# Patient Record
Sex: MALE | Race: WHITE
[De-identification: names, ages, dates, MRNs, and addresses within clinical notes are randomized per-mention and may not be internally consistent; named-entity substitution may affect disease eponyms.]

---

## 2019-07-15 LAB
ADD MANUAL DIFF: NO
ANION GAP SERPL CALC-SCNC: 10 MMOL/L (ref 5–19)
BASOPHILS # BLD AUTO: 0 10^3/UL (ref 0–0.2)
BASOPHILS NFR BLD AUTO: 0.9 % (ref 0–2)
BUN SERPL-MCNC: 15 MG/DL (ref 7–20)
CALCIUM: 9.5 MG/DL (ref 8.4–10.2)
CHLORIDE SERPL-SCNC: 105 MMOL/L (ref 98–107)
CO2 SERPL-SCNC: 26 MMOL/L (ref 22–30)
EOSINOPHIL # BLD AUTO: 0.1 10^3/UL (ref 0–0.6)
EOSINOPHIL NFR BLD AUTO: 2.6 % (ref 0–6)
ERYTHROCYTE [DISTWIDTH] IN BLOOD BY AUTOMATED COUNT: 13.7 % (ref 11.5–14)
GLUCOSE SERPL-MCNC: 134 MG/DL (ref 75–110)
HCT VFR BLD CALC: 44.5 % (ref 37.9–51)
HGB BLD-MCNC: 15.1 G/DL (ref 13.5–17)
LYMPHOCYTES # BLD AUTO: 1.4 10^3/UL (ref 0.5–4.7)
LYMPHOCYTES NFR BLD AUTO: 29.2 % (ref 13–45)
MCH RBC QN AUTO: 29.4 PG (ref 27–33.4)
MCHC RBC AUTO-ENTMCNC: 33.9 G/DL (ref 32–36)
MCV RBC AUTO: 87 FL (ref 80–97)
MONOCYTES # BLD AUTO: 0.4 10^3/UL (ref 0.1–1.4)
MONOCYTES NFR BLD AUTO: 9.4 % (ref 3–13)
NEUTROPHILS # BLD AUTO: 2.7 10^3/UL (ref 1.7–8.2)
NEUTS SEG NFR BLD AUTO: 57.9 % (ref 42–78)
PLATELET # BLD: 238 10^3/UL (ref 150–450)
POTASSIUM SERPL-SCNC: 4.5 MMOL/L (ref 3.6–5)
RBC # BLD AUTO: 5.14 10^6/UL (ref 4.35–5.55)
TOTAL CELLS COUNTED % (AUTO): 100 %
WBC # BLD AUTO: 4.6 10^3/UL (ref 4–10.5)

## 2019-07-22 ENCOUNTER — HOSPITAL ENCOUNTER (INPATIENT)
Dept: HOSPITAL 62 - INOR | Age: 59
LOS: 4 days | Discharge: HOME | DRG: 331 | End: 2019-07-26
Attending: SURGERY | Admitting: SURGERY
Payer: COMMERCIAL

## 2019-07-22 DIAGNOSIS — E78.00: ICD-10-CM

## 2019-07-22 DIAGNOSIS — Z86.010: ICD-10-CM

## 2019-07-22 DIAGNOSIS — G47.30: ICD-10-CM

## 2019-07-22 DIAGNOSIS — I10: ICD-10-CM

## 2019-07-22 DIAGNOSIS — Z88.6: ICD-10-CM

## 2019-07-22 DIAGNOSIS — Z90.5: ICD-10-CM

## 2019-07-22 DIAGNOSIS — D12.0: Primary | ICD-10-CM

## 2019-07-22 PROCEDURE — 85025 COMPLETE CBC W/AUTO DIFF WBC: CPT

## 2019-07-22 PROCEDURE — 93005 ELECTROCARDIOGRAM TRACING: CPT

## 2019-07-22 PROCEDURE — 5A09357 ASSISTANCE WITH RESPIRATORY VENTILATION, LESS THAN 24 CONSECUTIVE HOURS, CONTINUOUS POSITIVE AIRWAY PRESSURE: ICD-10-PCS | Performed by: SURGERY

## 2019-07-22 PROCEDURE — 80048 BASIC METABOLIC PNL TOTAL CA: CPT

## 2019-07-22 PROCEDURE — 0DTF4ZZ RESECTION OF RIGHT LARGE INTESTINE, PERCUTANEOUS ENDOSCOPIC APPROACH: ICD-10-PCS | Performed by: SURGERY

## 2019-07-22 PROCEDURE — 70490 CT SOFT TISSUE NECK W/O DYE: CPT

## 2019-07-22 PROCEDURE — 93010 ELECTROCARDIOGRAM REPORT: CPT

## 2019-07-22 PROCEDURE — S0028 INJECTION, FAMOTIDINE, 20 MG: HCPCS

## 2019-07-22 PROCEDURE — 36415 COLL VENOUS BLD VENIPUNCTURE: CPT

## 2019-07-22 PROCEDURE — 88309 TISSUE EXAM BY PATHOLOGIST: CPT

## 2019-07-22 PROCEDURE — 94799 UNLISTED PULMONARY SVC/PX: CPT

## 2019-07-22 PROCEDURE — 0D1B4ZL BYPASS ILEUM TO TRANSVERSE COLON, PERCUTANEOUS ENDOSCOPIC APPROACH: ICD-10-PCS | Performed by: SURGERY

## 2019-07-22 RX ADMIN — MORPHINE SULFATE PRN MG: 10 INJECTION INTRAMUSCULAR; INTRAVENOUS; SUBCUTANEOUS at 20:13

## 2019-07-22 RX ADMIN — FAMOTIDINE SCH MG: 10 INJECTION INTRAVENOUS at 21:28

## 2019-07-22 RX ADMIN — DEXTROSE, SODIUM CHLORIDE, AND POTASSIUM CHLORIDE PRN MLS/HR: 5; .45; .15 INJECTION INTRAVENOUS at 21:29

## 2019-07-22 RX ADMIN — HEPARIN SODIUM SCH: 5000 INJECTION, SOLUTION INTRAVENOUS; SUBCUTANEOUS at 21:24

## 2019-07-22 NOTE — OPERATIVE REPORT
Nonrecallable Operative Report


DATE OF SURGERY: 07/22/19


PREOPERATIVE DIAGNOSIS: right colon polyp


POSTOPERATIVE DIAGNOSIS: rt colon polyp


OPERATION: rt hemicolectomy, laparoscopic


SURGEON: HAIDER ISIDRO


1ST ASSISTANT: ELLIOTT THOMAS


ANESTHESIA: GA


TISSUE REMOVED OR ALTERED: rt colon


COMPLICATIONS: 





none


ESTIMATED BLOOD LOSS: 50cc


INTRAOPERATIVE FINDINGS: see dictation


PROCEDURE: 





see dictation

## 2019-07-22 NOTE — OPERATIVE REPORT E
Operative Report



NAME: JERRY MARR

MRN:  [id number]          : 1960 AGE:  58Y

DATE OF SURGERY: 2019                        ROOM: OR



PREOPERATIVE DIAGNOSIS:

RIGHT COLONIC SESSILE POLYP.



POSTOPERATIVE DIAGNOSIS:

RIGHT COLONIC SESSILE POLYP.



OPERATION:

Laparoscopic-assisted right hemicolectomy.



SURGEON:

HAIDER ISIDRO M.D.



ASSISTANT:

Meghan *------*, who was present for the entire case, for wound retraction

and wound closure.



PROCEDURE:

Patient was brought to the operating room, awake, alert, in stable

condition.  Placed on the operating table in supine position.  Induced

under general anesthesia, intubated.  The abdomen was prepped and draped

in the usual sterile manner for the procedure.



A Veress needle was placed into the umbilicus and the abdomen was

insufflated with 6 liters of CO2 gas.  A supraumbilical 10-mm incision was

made with a 15 blade and a 10-mm port placed in the abdominal cavity. 

Intraabdominal visualization revealed no evidence of a Veress needle or

trocar injury.  A left lower quadrant 10-mm port placed under direct

vision and a left-sided 5-mm port, all under direct vision.



The cecum was identified.  It was placed on traction.  The appendix was

identified.  The peritoneal attachments between the terminal ileum,

appendix and cecum were taken down with a LigaSure device.  Then we came

up along the ascending colon, along the peritoneal reflection with the

LigaSure device, all the way to the hepatic flexure.



Once this was accomplished, we mobilized the proximal transverse colon

away from the omentum with the LigaSure device to free up the hepatic

flexure and proximal transverse colon.  Once this was accomplished and we

had good mobilization of the right colon and hepatic flexure, we

identified the duodenum and made sure that there was no injury to that. 

We then removed the LigaSure device and made a transverse incision in the

right mid abdominal wall with a 15 blade, and carried out dissection out

through subcutaneous tissue with Bovie cautery.  The transversalis muscle

and rectus fascia were divided with Bovie cautery and we used the Richard

wound protector placed into the abdominal cavity.



Once the Richard wound protector was placed, we were able to deliver the

cecum and ascending colon to the wound.  We came across the distal ileum

with 1 firing of the ELDON stapler with a blue load.  Then we came across

the mesentery with multiple firings of the LigaSure device all the way

past the hepatic flexure to the proximal transverse colon.  We then were

able to remove the specimen.



We approximated the proximal transverse colon to the terminal ileum and

created an ileocolostomy anastomosis using sewn technique.  The outer

layer was interrupted 2-0 silk and the running inner layer was 3-0 Vicryl

and the anterior layer was interrupted 2-0 silk.  We then reapproximated

the mesenteric defect with figure-of-eight placed 2-0 silk.  We allowed

the specimen to drop back into the abdominal cavity and removed the Richard

wound protector.  We closed the posterior sheath with a running 0 Vicryl

and the anterior sheath was closed with interrupted 0 Vicryl.



Once this was accomplished, we irrigated the wound with sterile saline and

suctioned dry.  I then reinsufflated the abdominal cavity and identified

the ileum and identified our anastomosis as well as our mesentery repair. 

It was intact.  The anastomosis was not twisted and looked secure.  We

irrigated the right upper quadrant with normal saline, suctioned dry. 

Reduced the pneumoperitoneum.  We then proceeded to close the 10-mm

fascial defects at the umbilical port site and the left lower quadrant

with 0 Vicryl, and then closed all skin incisions with intracuticular 4-0

Biosyn.  Steri-Strips completed the procedure.



Estimated blood loss was less than 50 mL.  Sponge and needle counts

correct x2.  The patient was awakened in the operating room, extubated and

transferred to recovery in stable condition.  No complications.



DICTATING PHYSICIAN:  HAIDER ISIDRO M.D.





[id number]                  DT: 2019    1859

PHY#: [id number]            DD: 2019    1755

ID:   [id number]           JOB#: [id number]       ACCT: [id number]



cc:HAIDER ISIDRO M.D.

>

## 2019-07-23 LAB
ABSOLUTE LYMPHOCYTES# (MANUAL): 0.9 10^3/UL (ref 0.5–4.7)
ABSOLUTE MONOCYTES # (MANUAL): 0.5 10^3/UL (ref 0.1–1.4)
ADD MANUAL DIFF: YES
ANION GAP SERPL CALC-SCNC: 10 MMOL/L (ref 5–19)
BASOPHILS NFR BLD MANUAL: 0 % (ref 0–2)
BUN SERPL-MCNC: 14 MG/DL (ref 7–20)
CALCIUM: 9 MG/DL (ref 8.4–10.2)
CHLORIDE SERPL-SCNC: 103 MMOL/L (ref 98–107)
CO2 SERPL-SCNC: 24 MMOL/L (ref 22–30)
EOSINOPHIL NFR BLD MANUAL: 0 % (ref 0–6)
ERYTHROCYTE [DISTWIDTH] IN BLOOD BY AUTOMATED COUNT: 13.8 % (ref 11.5–14)
GLUCOSE SERPL-MCNC: 229 MG/DL (ref 75–110)
HCT VFR BLD CALC: 42.9 % (ref 37.9–51)
HGB BLD-MCNC: 14.1 G/DL (ref 13.5–17)
MCH RBC QN AUTO: 28.9 PG (ref 27–33.4)
MCHC RBC AUTO-ENTMCNC: 32.9 G/DL (ref 32–36)
MCV RBC AUTO: 88 FL (ref 80–97)
MONOCYTES % (MANUAL): 4 % (ref 3–13)
NEUTS BAND NFR BLD MANUAL: 2 % (ref 3–5)
PLATELET # BLD: 228 10^3/UL (ref 150–450)
PLATELET COMMENT: ADEQUATE
POTASSIUM SERPL-SCNC: 4.8 MMOL/L (ref 3.6–5)
RBC # BLD AUTO: 4.89 10^6/UL (ref 4.35–5.55)
RBC MORPH BLD: (no result)
SEGMENTED NEUTROPHILS % (MAN): 86 % (ref 42–78)
TOTAL CELLS COUNTED BLD: 100
VARIANT LYMPHS NFR BLD MANUAL: 8 % (ref 13–45)
WBC # BLD AUTO: 11.6 10^3/UL (ref 4–10.5)

## 2019-07-23 RX ADMIN — MORPHINE SULFATE PRN MG: 10 INJECTION INTRAMUSCULAR; INTRAVENOUS; SUBCUTANEOUS at 04:39

## 2019-07-23 RX ADMIN — HEPARIN SODIUM SCH UNIT: 5000 INJECTION, SOLUTION INTRAVENOUS; SUBCUTANEOUS at 22:42

## 2019-07-23 RX ADMIN — HEPARIN SODIUM SCH UNIT: 5000 INJECTION, SOLUTION INTRAVENOUS; SUBCUTANEOUS at 16:46

## 2019-07-23 RX ADMIN — DEXTROSE, SODIUM CHLORIDE, AND POTASSIUM CHLORIDE PRN MLS/HR: 5; .45; .15 INJECTION INTRAVENOUS at 16:46

## 2019-07-23 RX ADMIN — MORPHINE SULFATE PRN MG: 10 INJECTION INTRAMUSCULAR; INTRAVENOUS; SUBCUTANEOUS at 10:07

## 2019-07-23 RX ADMIN — DEXAMETHASONE SODIUM PHOSPHATE PRN MG: 10 INJECTION INTRAMUSCULAR; INTRAVENOUS at 04:46

## 2019-07-23 RX ADMIN — MORPHINE SULFATE PRN MG: 10 INJECTION INTRAMUSCULAR; INTRAVENOUS; SUBCUTANEOUS at 18:42

## 2019-07-23 RX ADMIN — FAMOTIDINE SCH MG: 10 INJECTION INTRAVENOUS at 22:42

## 2019-07-23 RX ADMIN — DEXAMETHASONE SODIUM PHOSPHATE PRN MG: 10 INJECTION INTRAMUSCULAR; INTRAVENOUS at 10:10

## 2019-07-23 RX ADMIN — FAMOTIDINE SCH MG: 10 INJECTION INTRAVENOUS at 09:49

## 2019-07-23 RX ADMIN — DEXTROSE, SODIUM CHLORIDE, AND POTASSIUM CHLORIDE PRN MLS/HR: 5; .45; .15 INJECTION INTRAVENOUS at 05:57

## 2019-07-23 RX ADMIN — HEPARIN SODIUM SCH UNIT: 5000 INJECTION, SOLUTION INTRAVENOUS; SUBCUTANEOUS at 05:57

## 2019-07-23 RX ADMIN — DEXAMETHASONE SODIUM PHOSPHATE PRN MG: 10 INJECTION INTRAMUSCULAR; INTRAVENOUS at 18:41

## 2019-07-23 NOTE — PDOC PROGRESS REPORT
Subjective


Progress Note for:: 07/23/19


Reason For Visit: 


RIGHT COLONIC MASS








Physical Exam


Vital Signs: 


                                        











Temp Pulse Resp BP Pulse Ox


 


 97.6 F   77   17   157/83 H  96 


 


 07/23/19 02:58  07/23/19 02:58  07/23/19 02:58  07/23/19 02:58  07/23/19 02:58








                                 Intake & Output











 07/22/19 07/23/19 07/24/19





 06:59 06:59 06:59


 


Intake Total  3650 


 


Output Total  1100 


 


Balance  2550 


 


Weight  113 kg 











General appearance: PRESENT: no acute distress


Head exam: PRESENT: normocephalic


Eye exam: PRESENT: EOMI


Mouth exam: PRESENT: moist


Neck exam: PRESENT: full ROM


Respiratory exam: PRESENT: unlabored


Cardiovascular exam: PRESENT: RRR


Pulses: PRESENT: +2 pedal pulses bilateral


GI/Abdominal exam: PRESENT: soft


Rectal exam: PRESENT: deferred


Extremities exam: PRESENT: full ROM


Musculoskeletal exam: PRESENT: full ROM


Neurological exam: PRESENT: alert, awake, oriented to person, oriented to place


Psychiatric exam: PRESENT: agitated, appropriate affect


Skin exam: PRESENT: dry





Results


Laboratory Results: 


                                        





                                 07/23/19 04:23 





                                 07/23/19 04:23 





                                        











  07/23/19 07/23/19





  04:23 04:23


 


WBC  11.6 H 


 


RBC  4.89 


 


Hgb  14.1 


 


Hct  42.9 


 


MCV  88 


 


MCH  28.9 


 


MCHC  32.9 


 


RDW  13.8 


 


Plt Count  228 


 


Seg Neutrophils %  Not Reportable 


 


Lymphocytes %  Not Reportable 


 


Monocytes %  Not Reportable 


 


Eosinophils %  Not Reportable 


 


Basophils %  Not Reportable 


 


Absolute Neutrophils  Not Reportable 


 


Absolute Lymphocytes  Not Reportable 


 


Absolute Monocytes  Not Reportable 


 


Absolute Eosinophils  Not Reportable 


 


Absolute Basophils  Not Reportable 


 


Sodium   136.9 L


 


Potassium   4.8


 


Chloride   103


 


Carbon Dioxide   24


 


Anion Gap   10


 


BUN   14


 


Creatinine   1.25


 


Est GFR ( Amer)   > 60


 


Est GFR (Non-Af Amer)   > 60


 


Glucose   229 H


 


Calcium   9.0














Assessment & Plan





- Plan Summary


Plan Summary: 





s/p lap rt hemicolectomy


'this am doing well


no complaints


swallowing well


no resp issues


plan due to difficult intubation, will order ct of the


neck per anesthesia request. 


cont clear liquids.

## 2019-07-23 NOTE — RADIOLOGY REPORT (SQ)
EXAM DESCRIPTION:  CT SOFT TISSUE NECK WITHOUT



COMPLETED DATE/TIME:  7/23/2019 9:25 am



REASON FOR STUDY:  difficult intubation D12.0  BENIGN NEOPLASM OF CECUM



COMPARISON:  None.



TECHNIQUE:  Noncontrast scanning from skull base through lung apices with review of bone, soft tissue
 and lung windows.  Reconstructed coronal and sagittal MPR images reviewed.  All images stored on PAC
S.

All CT scanners at this facility use dose modulation, iterative reconstruction, and/or weight based d
osing when appropriate to reduce radiation dose to as low as reasonably achievable (ALARA).

[facility]: Dose Right  [facility]: CareDose    [facility]: Dose Right    [facility]: Teradose 4D    [facility]: Smart Technologies



RADIATION DOSE:   mGy.



LIMITATIONS:  None.



FINDINGS:  SKULL BASE: Intact.

MAJOR SALIVARY GLANDS: No solid or cystic masses.  No inflammatory changes.

LYMPHADENOPATHY: There are scattered small cervical nodes most likely reactive.

MUCOSAL MASSES OR ASYMMETRY: No mucosal masses or asymmetry.

LARYNX/CORDS: No abnormal findings.

LUNG APICES: Clear.

BONES: Intact.

THYROID: Normal size.  No masses.

PARANASAL SINUSES: Clear.

OTHER: No other significant finding.



IMPRESSION:  NO SIGNIFICANT FINDING IN THE SOFT TISSUES OF THE NECK.



TECHNICAL DOCUMENTATION:  JOB ID:  [id number]

Quality ID # [id number]: Final reports with documentation of one or more dose reduction techniques (e.g., Au
tomated exposure control, adjustment of the mA and/or kV according to patient size, use of iterative 
reconstruction technique)

 2011 [employer]- All Rights Reserved



Reading location - IP/workstation name: [email or web address]

## 2019-07-24 RX ADMIN — MORPHINE SULFATE PRN MG: 10 INJECTION INTRAMUSCULAR; INTRAVENOUS; SUBCUTANEOUS at 19:58

## 2019-07-24 RX ADMIN — HEPARIN SODIUM SCH: 5000 INJECTION, SOLUTION INTRAVENOUS; SUBCUTANEOUS at 14:00

## 2019-07-24 RX ADMIN — MORPHINE SULFATE PRN MG: 10 INJECTION INTRAMUSCULAR; INTRAVENOUS; SUBCUTANEOUS at 00:02

## 2019-07-24 RX ADMIN — DEXAMETHASONE SODIUM PHOSPHATE PRN MG: 10 INJECTION INTRAMUSCULAR; INTRAVENOUS at 19:58

## 2019-07-24 RX ADMIN — DEXTROSE, SODIUM CHLORIDE, AND POTASSIUM CHLORIDE PRN MLS/HR: 5; .45; .15 INJECTION INTRAVENOUS at 00:10

## 2019-07-24 RX ADMIN — FAMOTIDINE SCH MG: 10 INJECTION INTRAVENOUS at 21:20

## 2019-07-24 RX ADMIN — FAMOTIDINE SCH MG: 10 INJECTION INTRAVENOUS at 11:10

## 2019-07-24 RX ADMIN — DEXTROSE AND SODIUM CHLORIDE PRN MLS/HR: 5; .45 INJECTION, SOLUTION INTRAVENOUS at 11:22

## 2019-07-24 RX ADMIN — DEXAMETHASONE SODIUM PHOSPHATE PRN MG: 10 INJECTION INTRAMUSCULAR; INTRAVENOUS at 11:10

## 2019-07-24 RX ADMIN — MORPHINE SULFATE PRN MG: 10 INJECTION INTRAMUSCULAR; INTRAVENOUS; SUBCUTANEOUS at 11:11

## 2019-07-24 RX ADMIN — DEXAMETHASONE SODIUM PHOSPHATE PRN MG: 10 INJECTION INTRAMUSCULAR; INTRAVENOUS at 00:09

## 2019-07-24 RX ADMIN — HEPARIN SODIUM SCH: 5000 INJECTION, SOLUTION INTRAVENOUS; SUBCUTANEOUS at 05:10

## 2019-07-24 RX ADMIN — DEXTROSE AND SODIUM CHLORIDE PRN MLS/HR: 5; .45 INJECTION, SOLUTION INTRAVENOUS at 21:31

## 2019-07-24 NOTE — PDOC PROGRESS REPORT
Subjective


Progress Note for:: 07/24/19


Subjective:: 





feels ok, no flatus


Reason For Visit: 


RIGHT COLONIC MASS








Physical Exam


Vital Signs: 


                                        











Temp Pulse Resp BP Pulse Ox


 


 98.6 F   83   20   130/77 H  94 


 


 07/23/19 23:43  07/23/19 23:43  07/23/19 23:43  07/23/19 23:43  07/23/19 23:43








                                 Intake & Output











 07/23/19 07/24/19 07/25/19





 06:59 06:59 06:59


 


Intake Total 3650 6110 


 


Output Total 1100 5070 


 


Balance 2550 1040 


 


Weight 113 kg 114.8 kg 











General appearance: PRESENT: no acute distress


Head exam: PRESENT: normocephalic


Eye exam: PRESENT: EOMI


Mouth exam: PRESENT: moist


Neck exam: PRESENT: full ROM


Respiratory exam: PRESENT: clear to auscultation roe


Cardiovascular exam: PRESENT: RRR


GI/Abdominal exam: PRESENT: hypoactive bowel sounds, soft


Rectal exam: PRESENT: deferred


Extremities exam: PRESENT: full ROM


Musculoskeletal exam: PRESENT: full ROM


Neurological exam: PRESENT: alert, awake, oriented to person, oriented to place


Psychiatric exam: PRESENT: appropriate affect


Skin exam: PRESENT: dry





Results


Laboratory Results: 


                                        





                                 07/23/19 04:23 





                                 07/23/19 04:23 








Impressions: 


                                        





Soft Tissue Neck CT  07/23/19 00:00


IMPRESSION:  NO SIGNIFICANT FINDING IN THE SOFT TISSUES OF THE NECK.


 














Assessment & Plan





- Plan Summary


Plan Summary: 





s/p right hemicolectomy


doing ok


awaitig for return of bowel function


will cont clear liquids.

## 2019-07-25 RX ADMIN — DEXTROSE AND SODIUM CHLORIDE PRN MLS/HR: 5; .45 INJECTION, SOLUTION INTRAVENOUS at 09:09

## 2019-07-25 RX ADMIN — KETOROLAC TROMETHAMINE SCH MG: 30 INJECTION, SOLUTION INTRAMUSCULAR at 12:28

## 2019-07-25 RX ADMIN — HEPARIN SODIUM SCH: 5000 INJECTION, SOLUTION INTRAVENOUS; SUBCUTANEOUS at 21:14

## 2019-07-25 RX ADMIN — FAMOTIDINE SCH MG: 10 INJECTION INTRAVENOUS at 21:20

## 2019-07-25 RX ADMIN — KETOROLAC TROMETHAMINE SCH MG: 30 INJECTION, SOLUTION INTRAMUSCULAR at 18:38

## 2019-07-25 RX ADMIN — FAMOTIDINE SCH MG: 10 INJECTION INTRAVENOUS at 09:09

## 2019-07-25 RX ADMIN — DEXTROSE AND SODIUM CHLORIDE PRN MLS/HR: 5; .45 INJECTION, SOLUTION INTRAVENOUS at 21:21

## 2019-07-25 RX ADMIN — HEPARIN SODIUM SCH: 5000 INJECTION, SOLUTION INTRAVENOUS; SUBCUTANEOUS at 14:33

## 2019-07-25 RX ADMIN — HEPARIN SODIUM SCH: 5000 INJECTION, SOLUTION INTRAVENOUS; SUBCUTANEOUS at 05:03

## 2019-07-25 NOTE — PDOC PROGRESS REPORT
Subjective


Progress Note for:: 07/25/19


Subjective:: 





feels ok, passing flatus


Reason For Visit: 


RIGHT COLONIC MASS








Physical Exam


Vital Signs: 


                                        











Temp Pulse Resp BP Pulse Ox


 


 98.7 F   74   15   169/83 H  94 


 


 07/25/19 08:13  07/25/19 08:13  07/25/19 08:13  07/25/19 08:13  07/25/19 08:13








                                 Intake & Output











 07/24/19 07/25/19 07/26/19





 06:59 06:59 06:59


 


Intake Total 6110 5007 1000


 


Output Total 5070 5000 


 


Balance 1040 7 1000


 


Weight 114.8 kg 117.2 kg 











General appearance: PRESENT: no acute distress


Head exam: PRESENT: normocephalic


Eye exam: PRESENT: EOMI


Mouth exam: PRESENT: moist


Neck exam: PRESENT: full ROM


Respiratory exam: PRESENT: clear to auscultation roe


Cardiovascular exam: PRESENT: RRR


Pulses: PRESENT: +2 pedal pulses bilateral


GI/Abdominal exam: PRESENT: soft


Rectal exam: PRESENT: deferred


Extremities exam: PRESENT: full ROM


Musculoskeletal exam: PRESENT: full ROM


Neurological exam: PRESENT: alert, awake, oriented to person


Psychiatric exam: PRESENT: appropriate affect


Focused psych exam: PRESENT: other


Skin exam: PRESENT: dry





Results


Laboratory Results: 


                                        





                                 07/23/19 04:23 





                                 07/23/19 04:23 








Impressions: 


                                        





Soft Tissue Neck CT  07/23/19 00:00


IMPRESSION:  NO SIGNIFICANT FINDING IN THE SOFT TISSUES OF THE NECK.


 














Assessment & Plan





- Plan Summary


Plan Summary: 





afeb vss


passing some flatus


path discussed


will advance to full liqudis./

## 2019-07-26 VITALS — SYSTOLIC BLOOD PRESSURE: 152 MMHG | DIASTOLIC BLOOD PRESSURE: 94 MMHG

## 2019-07-26 RX ADMIN — HEPARIN SODIUM SCH: 5000 INJECTION, SOLUTION INTRAVENOUS; SUBCUTANEOUS at 05:05

## 2019-07-26 RX ADMIN — KETOROLAC TROMETHAMINE SCH MG: 30 INJECTION, SOLUTION INTRAMUSCULAR at 00:13

## 2019-07-26 RX ADMIN — KETOROLAC TROMETHAMINE SCH MG: 30 INJECTION, SOLUTION INTRAMUSCULAR at 06:10

## 2019-07-26 NOTE — DISCHARGE SUMMARY E
Discharge Summary



NAME: JERRY MARR

MRN:  [id number]        : 1960     AGE: 58Y

ADMITTED: 2019                  DISCHARGED: 2019



The patient was discharged from room 533.



ADMISSION DIAGNOSIS:

Right cecal mass.



DISCHARGE DIAGNOSIS:

Right cecal mass.



OPERATIVE PROCEDURE:

Laparoscopic right hemicolectomy.



SURGEON:

Byron Isidro M.D.



REASON FOR HOSPITALIZATION/HOSPITAL COURSE:

This is a 58-year-old male who underwent a screening colonoscopy as an

outpatient by GI physician who noted that he had a cecal mass and could

not be removed endoscopically.  He was therefore referred for surgery.  He

was admitted on the day of admission for a laparoscopic right

hemicolectomy, which he underwent and tolerated well.  Postoperatively, he

had a routine benign postop course.  He was started on a clear liquid diet

on hospital day one, which was slowly advanced to a soft diet by the time

of discharge.  During his hospital course, he had normal resolution of

bowel function, and at the time of discharge, he is now tolerating a soft

diet, having normal bowel movements, his wound is clean and dry, and he is

ready for discharge home.  He will be given Toradol 10 mg tablets for pain

as well as tramadol 50 mg tablets for pain.  He will resume his own

medications at home.  He is instructed not to lift anything greater than

5-10 pounds for the next 4-6 weeks, and he will be given a postop

appointment with me for a wound check in 7-10 days.



DISCHARGE DIAGNOSIS:

Right cecal polyp.





DICTATING PHYSICIAN:  BYRON ISIDRO M.D.





[id number]                  DT: 2019    1328

PHY#: [id number]            DD: 2019    1314

ID:   [id number]           JOB#: [id number]       ACCT: [id number]



cc:BYRON ISIDRO M.D.

>

## 2020-10-02 ENCOUNTER — HOSPITAL ENCOUNTER (OUTPATIENT)
Dept: HOSPITAL 62 - RAD | Age: 60
End: 2020-10-02
Attending: INTERNAL MEDICINE
Payer: COMMERCIAL

## 2020-10-02 DIAGNOSIS — R94.5: Primary | ICD-10-CM

## 2020-10-02 DIAGNOSIS — R16.0: ICD-10-CM

## 2020-10-02 DIAGNOSIS — K76.0: ICD-10-CM

## 2020-10-02 PROCEDURE — 76705 ECHO EXAM OF ABDOMEN: CPT

## 2020-10-02 NOTE — RADIOLOGY REPORT (SQ)
EXAM DESCRIPTION:  U/S ABDOMEN LIMITED W/O DOP



IMAGES COMPLETED DATE/TIME:  10/2/2020 11:56 am



REASON FOR STUDY:  R94.5 ABNORMAL RESULTS OF LIVER FUNCTION STUDIES R94.5  ABNORMAL RESULTS OF LIVER 
FUNCTION STUDIES



COMPARISON:  None.



TECHNIQUE:  Dynamic and static grayscale images acquired of the abdomen and recorded on PACS. Additio
nal selected color Doppler and spectral images recorded.



LIMITATIONS:  None.



FINDINGS:  PANCREAS: Poorly seen.  No mass in the head of the pancreas.

LIVER: Increased echogenicity.  Hepatomegaly.

LIVER VASCULATURE: Normal directional flow of the main portal vein and hepatic veins.

GALLBLADDER: No stones. Normal wall thickness. No pericholecystic fluid.

ULTRASOUND-DETECTED STEVENSON'S SIGN: Negative.

INTRAHEPATIC DUCTS AND COMMON DUCT: CBD and intrahepatic ducts normal caliber. No filling defects.

AORTA: No aneurysm.

RIGHT KIDNEY:  Normal size, 13.8 cm. Normal echogenicity.  4.3 cm lower pole cyst.  No solid or suspi
cious masses. No hydronephrosis. No calcifications.

PERITONEAL AND RIGHT PLEURAL SPACE: No ascites or effusions.

OTHER: No other significant findings.



IMPRESSION:  Hepatomegaly.  Hepatic steatosis.



TECHNICAL DOCUMENTATION:  JOB ID:  [id number]

 2011 [employer]- All Rights Reserved



Reading location - IP/workstation name: [email or web address]